# Patient Record
Sex: MALE | URBAN - METROPOLITAN AREA
[De-identification: names, ages, dates, MRNs, and addresses within clinical notes are randomized per-mention and may not be internally consistent; named-entity substitution may affect disease eponyms.]

---

## 2021-12-03 ENCOUNTER — OFFICE VISIT (OUTPATIENT)
Dept: URBAN - METROPOLITAN AREA CLINIC 6 | Facility: CLINIC | Age: 61
End: 2021-12-03

## 2021-12-03 DIAGNOSIS — H17.9 CORNEAL SCAR: Primary | ICD-10-CM

## 2021-12-03 PROCEDURE — 92002 INTRM OPH EXAM NEW PATIENT: CPT | Performed by: OPTOMETRIST

## 2021-12-03 ASSESSMENT — INTRAOCULAR PRESSURE
OD: 9
OS: 12

## 2021-12-03 NOTE — IMPRESSION/PLAN
Impression: Corneal scar: H17.9. Plan: FB removed at [facility] or fell out before arriving here. Cornea well healed today with remaining corneal scar. Start artificial tears QID for any residual discomfort.